# Patient Record
Sex: FEMALE | Race: WHITE | NOT HISPANIC OR LATINO | Employment: UNEMPLOYED | URBAN - METROPOLITAN AREA
[De-identification: names, ages, dates, MRNs, and addresses within clinical notes are randomized per-mention and may not be internally consistent; named-entity substitution may affect disease eponyms.]

---

## 2018-01-17 ENCOUNTER — HOSPITAL ENCOUNTER (EMERGENCY)
Facility: MEDICAL CENTER | Age: 38
End: 2018-01-17
Attending: EMERGENCY MEDICINE
Payer: MEDICAID

## 2018-01-17 ENCOUNTER — APPOINTMENT (OUTPATIENT)
Dept: RADIOLOGY | Facility: MEDICAL CENTER | Age: 38
End: 2018-01-17
Attending: EMERGENCY MEDICINE
Payer: MEDICAID

## 2018-01-17 VITALS
SYSTOLIC BLOOD PRESSURE: 100 MMHG | BODY MASS INDEX: 27.37 KG/M2 | OXYGEN SATURATION: 98 % | TEMPERATURE: 98.6 F | RESPIRATION RATE: 16 BRPM | HEART RATE: 65 BPM | DIASTOLIC BLOOD PRESSURE: 65 MMHG | HEIGHT: 67 IN | WEIGHT: 174.38 LBS

## 2018-01-17 DIAGNOSIS — S30.0XXA CONTUSION OF LOWER BACK, INITIAL ENCOUNTER: ICD-10-CM

## 2018-01-17 DIAGNOSIS — S30.0XXA CONTUSION OF SACRUM, INITIAL ENCOUNTER: ICD-10-CM

## 2018-01-17 PROCEDURE — 72131 CT LUMBAR SPINE W/O DYE: CPT

## 2018-01-17 PROCEDURE — 99284 EMERGENCY DEPT VISIT MOD MDM: CPT | Mod: 25

## 2018-01-17 RX ORDER — OXYCODONE HYDROCHLORIDE AND ACETAMINOPHEN 5; 325 MG/1; MG/1
1-2 TABLET ORAL EVERY 4 HOURS PRN
Qty: 15 TAB | Refills: 0 | Status: SHIPPED | OUTPATIENT
Start: 2018-01-17 | End: 2018-01-22

## 2018-01-17 RX ORDER — DOCUSATE SODIUM 100 MG/1
100 CAPSULE, LIQUID FILLED ORAL 2 TIMES DAILY
Qty: 30 CAP | Refills: 0 | Status: SHIPPED | OUTPATIENT
Start: 2018-01-17

## 2018-01-17 ASSESSMENT — PAIN SCALES - GENERAL: PAINLEVEL_OUTOF10: 8

## 2018-01-18 NOTE — ED PROVIDER NOTES
"ED Provider Note    CHIEF COMPLAINT  Chief Complaint   Patient presents with   • Back Pain     Fell 12/31 down stairs onto tailbone and had hematoma. Now has c/o pain to left side hip and lower back. Pain has progessively gotten worst. Has been seen at [facility] and [facility] for same issue.        HPI  Annemarie Peña is a 37 y.o. female who presents to the emergency department with low back discomfort. On New Year's Fa[place] the patient fell down some stairs striking her tailbone. Since he calmly she went to [facility] where she had a negative x-ray but has had significant increase in pain and swelling. She followed up at [facility] she states did not do anything for her. She says she has a large swollen area to the buttocks. She does not have any radicular pain. She does not have any paresthesias. The pain is worse with sitting. She has not had any vomiting. She does not have any urinary symptoms. The pain is moderate to severe in intensity.    REVIEW OF SYSTEMS  See HPI for further details. All other systems are negative.     PAST MEDICAL HISTORY  No past medical history on file.    SOCIAL HISTORY  Social History     Social History   • Marital status:      Spouse name: N/A   • Number of children: N/A   • Years of education: N/A     Social History Main Topics   • Smoking status: Never Smoker   • Smokeless tobacco: Never Used   • Alcohol use No   • Drug use: No   • Sexual activity: Not on file     Other Topics Concern   • Not on file     Social History Narrative   • No narrative on file           PHYSICAL EXAM  VITAL SIGNS: /78   Pulse 84   Temp 37.1 °C (98.8 °F)   Resp 18   Ht 1.702 m (5' 7\")   Wt 79.1 kg (174 lb 6.1 oz)   LMP 01/01/2018 (Exact Date)   SpO2 98%   BMI 27.31 kg/m²   Constitutional: Mild acute distress, Non-toxic appearance.   HENT: Normocephalic, Atraumatic, tympanic membranes are intact and nonerythematous bilaterally, Oropharynx moist without " exudates or erythema, Nose normal.   Eyes: PERRLA, EOMI, Conjunctiva normal.  Neck: Supple without meningismus  Lymphatic: No lymphadenopathy noted.   Cardiovascular: Normal heart rate, Normal rhythm, No murmurs, No rubs, No gallops.   Thorax & Lungs: Normal breath sounds, No respiratory distress, No wheezing, No chest tenderness.   Abdomen: Bowel sounds normal, Soft, No tenderness, no rebound, no guarding, no distention, No masses, No pulsatile masses.   Skin: Warm, Dry, No erythema, No rash.   Back: Midline lumbar discomfort in the low lumbar spine, significant discomfort in the sacral region, large hematoma in the left gluteus region extending into the lumbar region.  Extremities: Atraumatic with symmetric distal pulses, No edema, No tenderness, No cyanosis, No clubbing.   Neurologic: Alert & oriented x 3, cranial nerves II through XII are intact, Normal motor function, Normal sensory function, No focal deficits noted.   Psychiatric: Affect normal, Judgment normal, Mood normal.     RADIOLOGY/PROCEDURES  CT-LSPINE W/O PLUS RECONS   Final Result      1.  Negative for lumbar spine fracture or malalignment      2.  Moderate size left gluteal subcutaneous contusion or hematoma      3.  1 mm nonobstructive left renal calculus          COURSE & MEDICAL DECISION MAKING  Pertinent Labs & Imaging studies reviewed. (See chart for details)  Is a 37-year-old female who presents emergency department with sacral discomfort. She does clinically have a large hematoma in the left gluteus region. This is evident on the CT scan as well. Otherwise the CT scan does not show any evidence of compression fracture or sacral fracture. Therefore we'll continue supportive treatment. I did perform a narcotic check on the patient will be discharged on 15 more tablets of Percocet for pain control due to the large hematoma formation. She'll also be placed on Colace. The patient's instructed to use a donut pillow for support. She will follow up  with her primary care doctor she is not better in approximately a week and she'll return here sooner if she is acutely worse.    FINAL IMPRESSION  1. Cervical contusion with large gluteal hematoma   Disposition  The patient will be discharged in stable condition    Electronically signed by: Jarett Ro, 1/17/2018 8:04 PM

## 2018-01-18 NOTE — ED NOTES
Pt dc'd home with prescriptions for pain and colace, controlled substance form completed as per [facility] policy, opportunity provided to ask questions, all questions answered. Pt ambulated out of ed with steady gait.

## 2018-01-18 NOTE — ED NOTES
ERP at bedside. Pt agrees with plan of care discussed by ERP. AIDET acknowledged with patient. Tan in low position, side rail up for pt safety. Call light within reach. Will continue to monitor.

## 2018-01-18 NOTE — ED NOTES
"Chief Complaint   Patient presents with   • Back Pain     Fell 12/31 down stairs onto tailbone and had hematoma. Now has c/o pain to left side hip and lower back. Pain has progessively gotten worst. Has been seen at [facility] and [facility] for same issue.      /78   Pulse 84   Temp 37.1 °C (98.8 °F)   Resp 18   Ht 1.702 m (5' 7\")   Wt 79.1 kg (174 lb 6.1 oz)   SpO2 98%   BMI 27.31 kg/m²     "

## 2018-01-21 ENCOUNTER — HOSPITAL ENCOUNTER (EMERGENCY)
Facility: MEDICAL CENTER | Age: 38
End: 2018-01-21
Attending: EMERGENCY MEDICINE
Payer: MEDICAID

## 2018-01-21 VITALS
OXYGEN SATURATION: 96 % | RESPIRATION RATE: 18 BRPM | BODY MASS INDEX: 27.27 KG/M2 | HEIGHT: 67 IN | HEART RATE: 90 BPM | WEIGHT: 173.72 LBS | SYSTOLIC BLOOD PRESSURE: 106 MMHG | TEMPERATURE: 98.7 F | DIASTOLIC BLOOD PRESSURE: 73 MMHG

## 2018-01-21 DIAGNOSIS — J06.9 URI WITH COUGH AND CONGESTION: ICD-10-CM

## 2018-01-21 PROCEDURE — 99283 EMERGENCY DEPT VISIT LOW MDM: CPT

## 2018-01-21 RX ORDER — FEXOFENADINE HCL AND PSEUDOEPHEDRINE HCI 60; 120 MG/1; MG/1
1 TABLET, EXTENDED RELEASE ORAL 2 TIMES DAILY
Qty: 20 TAB | Refills: 0 | Status: SHIPPED | OUTPATIENT
Start: 2018-01-21

## 2018-01-21 ASSESSMENT — PAIN SCALES - GENERAL: PAINLEVEL_OUTOF10: 5

## 2018-01-22 NOTE — ED PROVIDER NOTES
"ED Provider Note    CHIEF COMPLAINT  Chief Complaint   Patient presents with   • Cough     cough x 2 days  fever congestion  headache     Seen at 5:54 PM    HPI  Annemarie Peña is a 37 y.o. female who presents approximately 48-72 hours of nasal congestion, mildly productive cough, mild headaches, body aches and generalized malaise. She checks in today with her 2 sons who are also being evaluated for similar symptoms.    She notes some subjective fevers. She denies any neck pain, severe chest pain, shortness of breath, abdominal pain, nausea, vomiting, impaired immunity, rash    REVIEW OF SYSTEMS  See HPI  PAST MEDICAL HISTORY       SOCIAL HISTORY  Social History     Social History Main Topics   • Smoking status: Never Smoker   • Smokeless tobacco: Never Used   • Alcohol use No   • Drug use: No   • Sexual activity: Not on file       SURGICAL HISTORY   has a past surgical history that includes gyn surgery.    CURRENT MEDICATIONS  Reviewed.  See Encounter Summary.     ALLERGIES  Allergies   Allergen Reactions   • Pcn [Penicillins] Rash       PHYSICAL EXAM  VITAL SIGNS: /73   Pulse 90   Temp 37.1 °C (98.7 °F)   Resp 18   Ht 1.702 m (5' 7\")   Wt 78.8 kg (173 lb 11.6 oz)   LMP 01/01/2018 (Exact Date)   SpO2 96%   BMI 27.21 kg/m²   Constitutional: Awake, alert in no apparent distress.  HENT: Normocephalic, neck is supple, full range of motion, Bilateral external ears normal. Nose normal.   Eyes: Conjunctiva normal, non-icteric, EOMI.    Thorax & Lungs: Easy unlabored respirations, clear to auscultation bilaterally  Cardiovascular:  Regular rate and rhythm, no murmurs rubs or gallops  Abdomen:  No distention, soft, nontender  Skin: Visualized skin is  Dry, No erythema, No rash.   Extremities:   atraumatic   Neurologic: Alert, Grossly non-focal.   Psychiatric: Affect and Mood normal  No lymphadenopathy.  RADIOLOGY  No orders to display     The radiologist's interpretation of all radiological studies have been " reviewed by me.  Nursing notes and vital signs were reviewed. (See chart for details)    Decision Making:  This is a 37 y.o. year old female who presents with short duration of subjective fevers, cough, rhinorrhea and generalized malaise. She is well-appearing, she denies any chest pain or shortness of breath. She has reassuring vitals and a normal physical examination. I do not suspect a serious back to infection. I do not suspect pneumonia, do not believe antibiotics are indicated.    I isolated this is consistent with a viral illness. Influenza is possible though the patient is out of any window for Tamiflu and thus I do not feel that testing is indicated.    I recommend ibuprofen and Tylenol for fevers, aches and pains. I have also prescribed pseudoephedrine that will help with the nasal decongestion. I explained the symptoms may last up to 7-10 days. She is return for any severe shortness of breath, failure to improve, worsening fevers, worse headache of life or any other concern.    DISPOSITION:  Patient will be discharged home in good condition.    Discharge Medications:  Discharge Medication List as of 1/21/2018  6:10 PM      START taking these medications    Details   fexofenadine-pseudoephedrine (ALLEGRA-D)  MG per tablet Take 1 Tab by mouth 2 times a day., Disp-20 Tab, R-0, Print Rx Paper             The patient was discharged home (see d/c instructions) and told to return immediately for any signs or symptoms listed, or any worsening at all.  The patient verbally agreed to the discharge precautions and follow-up plan which is documented in EPIC.    FINAL IMPRESSION  1. URI with cough and congestion

## 2018-01-22 NOTE — DISCHARGE INSTRUCTIONS
"Antibiotic Nonuse   Your caregiver felt that the infection or problem was not one that would be helped with an antibiotic.  Infections may be caused by viruses or bacteria. Only a caregiver can tell which one of these is the likely cause of an illness. A cold is the most common cause of infection in both adults and children. A cold is a virus. Antibiotic treatment will have no effect on a viral infection. Viruses can lead to many lost days of work caring for sick children and many missed days of school. Children may catch as many as 10 \"colds\" or \"flus\" per year during which they can be tearful, cranky, and uncomfortable. The goal of treating a virus is aimed at keeping the ill person comfortable.  Antibiotics are medications used to help the body fight bacterial infections. There are relatively few types of bacteria that cause infections but there are hundreds of viruses. While both viruses and bacteria cause infection they are very different types of germs. A viral infection will typically go away by itself within 7 to 10 days. Bacterial infections may spread or get worse without antibiotic treatment.  Examples of bacterial infections are:  · Sore throats (like strep throat or tonsillitis).  · Infection in the lung (pneumonia).  · Ear and skin infections.  Examples of viral infections are:  · Colds or flus.  · Most coughs and bronchitis.  · Sore throats not caused by Strep.  · Runny noses.  It is often best not to take an antibiotic when a viral infection is the cause of the problem. Antibiotics can kill off the helpful bacteria that we have inside our body and allow harmful bacteria to start growing. Antibiotics can cause side effects such as allergies, nausea, and diarrhea without helping to improve the symptoms of the viral infection. Additionally, repeated uses of antibiotics can cause bacteria inside of our body to become resistant. That resistance can be passed onto harmful bacterial. The next time you have " an infection it may be harder to treat if antibiotics are used when they are not needed. Not treating with antibiotics allows our own immune system to develop and take care of infections more efficiently. Also, antibiotics will work better for us when they are prescribed for bacterial infections.  Treatments for a child that is ill may include:  · Give extra fluids throughout the day to stay hydrated.  · Get plenty of rest.  · Only give your child over-the-counter or prescription medicines for pain, discomfort, or fever as directed by your caregiver.  · The use of a cool mist humidifier may help stuffy noses.  · Cold medications if suggested by your caregiver.  Your caregiver may decide to start you on an antibiotic if:  · The problem you were seen for today continues for a longer length of time than expected.  · You develop a secondary bacterial infection.  SEEK MEDICAL CARE IF:  · Fever lasts longer than 5 days.  · Symptoms continue to get worse after 5 to 7 days or become severe.  · Difficulty in breathing develops.  · Signs of dehydration develop (poor drinking, rare urinating, dark colored urine).  · Changes in behavior or worsening tiredness (listlessness or lethargy).  Document Released: 02/26/2003 Document Revised: 03/11/2013 Document Reviewed: 08/25/2010  [employer]® Patient Information ©2014 [employer].

## 2021-07-06 ENCOUNTER — TELEPHONE (OUTPATIENT)
Dept: SCHEDULING | Facility: IMAGING CENTER | Age: 41
End: 2021-07-06